# Patient Record
Sex: FEMALE | Race: WHITE | Employment: FULL TIME | URBAN - METROPOLITAN AREA
[De-identification: names, ages, dates, MRNs, and addresses within clinical notes are randomized per-mention and may not be internally consistent; named-entity substitution may affect disease eponyms.]

---

## 2023-03-04 ENCOUNTER — APPOINTMENT (OUTPATIENT)
Dept: CT IMAGING | Age: 18
End: 2023-03-04
Attending: STUDENT IN AN ORGANIZED HEALTH CARE EDUCATION/TRAINING PROGRAM
Payer: COMMERCIAL

## 2023-03-04 ENCOUNTER — APPOINTMENT (OUTPATIENT)
Dept: GENERAL RADIOLOGY | Age: 18
End: 2023-03-04
Attending: STUDENT IN AN ORGANIZED HEALTH CARE EDUCATION/TRAINING PROGRAM
Payer: COMMERCIAL

## 2023-03-04 ENCOUNTER — HOSPITAL ENCOUNTER (EMERGENCY)
Age: 18
Discharge: HOME | End: 2023-03-04
Attending: STUDENT IN AN ORGANIZED HEALTH CARE EDUCATION/TRAINING PROGRAM
Payer: COMMERCIAL

## 2023-03-04 VITALS
BODY MASS INDEX: 22.4 KG/M2 | WEIGHT: 160 LBS | DIASTOLIC BLOOD PRESSURE: 83 MMHG | TEMPERATURE: 97.7 F | SYSTOLIC BLOOD PRESSURE: 136 MMHG | HEIGHT: 71 IN | OXYGEN SATURATION: 97 % | RESPIRATION RATE: 18 BRPM | HEART RATE: 92 BPM

## 2023-03-04 DIAGNOSIS — S80.01XA CONTUSION OF RIGHT KNEE, INITIAL ENCOUNTER: ICD-10-CM

## 2023-03-04 DIAGNOSIS — S90.02XA CONTUSION OF LEFT ANKLE, INITIAL ENCOUNTER: ICD-10-CM
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
## 2023-03-04 PROCEDURE — 96374 THER/PROPH/DIAG INJ IV PUSH: CPT

## 2023-03-04 PROCEDURE — 73110 X-RAY EXAM OF WRIST: CPT

## 2023-03-04 PROCEDURE — 99284 EMERGENCY DEPT VISIT MOD MDM: CPT

## 2023-03-04 PROCEDURE — 70450 CT HEAD/BRAIN W/O DYE: CPT

## 2023-03-04 PROCEDURE — 71045 X-RAY EXAM CHEST 1 VIEW: CPT

## 2023-03-04 PROCEDURE — 73562 X-RAY EXAM OF KNEE 3: CPT

## 2023-03-04 PROCEDURE — 73610 X-RAY EXAM OF ANKLE: CPT

## 2023-03-04 PROCEDURE — 72125 CT NECK SPINE W/O DYE: CPT

## 2023-03-04 PROCEDURE — 36415 COLL VENOUS BLD VENIPUNCTURE: CPT

## 2023-03-04 PROCEDURE — 93005 ELECTROCARDIOGRAM TRACING: CPT

## 2023-03-04 PROCEDURE — 74011250636 HC RX REV CODE- 250/636: Performed by: STUDENT IN AN ORGANIZED HEALTH CARE EDUCATION/TRAINING PROGRAM

## 2023-03-04 RX ORDER — IBUPROFEN 600 MG/1
600 TABLET ORAL
Qty: 30 TABLET | Refills: 0 | Status: SHIPPED | OUTPATIENT
Start: 2023-03-04

## 2023-03-04 RX ORDER — KETOROLAC TROMETHAMINE 30 MG/ML
15 INJECTION, SOLUTION INTRAMUSCULAR; INTRAVENOUS
Status: COMPLETED | OUTPATIENT
Start: 2023-03-04 | End: 2023-03-04

## 2023-03-04 RX ADMIN — KETOROLAC TROMETHAMINE 15 MG: 30 INJECTION, SOLUTION INTRAMUSCULAR; INTRAVENOUS at 02:38
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
## 2023-03-07 LAB
ATRIAL RATE: 114 BPM
CALCULATED P AXIS, ECG09: 52 DEGREES
CALCULATED R AXIS, ECG10: 77 DEGREES
CALCULATED T AXIS, ECG11: 29 DEGREES
DIAGNOSIS, 93000: NORMAL
P-R INTERVAL, ECG05: 110 MS
Q-T INTERVAL, ECG07: 302 MS
QRS DURATION, ECG06: 74 MS
QTC CALCULATION (BEZET), ECG08: 416 MS
VENTRICULAR RATE, ECG03: 114 BPM

## 2025-07-17 ENCOUNTER — OFFICE VISIT (OUTPATIENT)
Age: 20
End: 2025-07-17

## 2025-07-17 VITALS
OXYGEN SATURATION: 98 % | RESPIRATION RATE: 16 BRPM | SYSTOLIC BLOOD PRESSURE: 122 MMHG | HEIGHT: 71 IN | WEIGHT: 162.4 LBS | BODY MASS INDEX: 22.73 KG/M2 | TEMPERATURE: 98.3 F | DIASTOLIC BLOOD PRESSURE: 82 MMHG | HEART RATE: 94 BPM

## 2025-07-17 DIAGNOSIS — H60.331 ACUTE SWIMMER'S EAR OF RIGHT SIDE: Primary | ICD-10-CM

## 2025-07-17 DIAGNOSIS — R03.0 ELEVATED BLOOD PRESSURE READING: ICD-10-CM

## 2025-07-17 RX ORDER — DEXAMETHASONE SODIUM PHOSPHATE 4 MG/ML
4 INJECTION, SOLUTION INTRA-ARTICULAR; INTRALESIONAL; INTRAMUSCULAR; INTRAVENOUS; SOFT TISSUE ONCE
Status: SHIPPED | OUTPATIENT
Start: 2025-07-17

## 2025-07-17 RX ORDER — CIPROFLOXACIN AND DEXAMETHASONE 3; 1 MG/ML; MG/ML
4 SUSPENSION/ DROPS AURICULAR (OTIC) 2 TIMES DAILY
Qty: 7.5 ML | Refills: 0 | Status: SHIPPED | OUTPATIENT
Start: 2025-07-17 | End: 2025-07-24

## 2025-07-17 RX ORDER — LISDEXAMFETAMINE DIMESYLATE 50 MG/1
50 CAPSULE ORAL EVERY MORNING
COMMUNITY
Start: 2025-05-12

## 2025-07-17 ASSESSMENT — ENCOUNTER SYMPTOMS
RESPIRATORY NEGATIVE: 1
EYES NEGATIVE: 1
ALLERGIC/IMMUNOLOGIC NEGATIVE: 1
GASTROINTESTINAL NEGATIVE: 1

## 2025-07-17 NOTE — PROGRESS NOTES
2025   Davi Novoa (: 2005) is a 19 y.o. female, here for evaluation of the following chief complaint(s):  Ear Pain (Pt c.o right ear pain, \"hard to open mouth\". Sx onset this weekend progressively gotten worst. Tylenol & ibuprofen taken for pain. Pain level 7/10. Sx have not subsided. )     ASSESSMENT/PLAN:  Below is the assessment and plan developed based on review of pertinent history, physical exam, labs, studies, and medications.         Assessment & Plan  Elevated blood pressure reading   I discussed with patient the elevated blood pressure readings.  We reviewed activities to reduce blood pressure to include low salt diet, exercise, weight loss, healthy diet and alcohol avoidance.  Patient understands.  Will follow up with PCP as needed.      Orders:    Ambulatory referral to Internal Medicine    Acute swimmer's ear of right side   Patient is previously healthy and immunocompetent, presenting with otitis externa.  There is no evidence for significant complications at this time.  Patient is alert and completely non-toxic.  There is no evidence for tympanic membrane rupture.   There is no mastoid swelling.  There is no evidence for otitis media based on examination.  There is no suggestion of systemic complications or sepsis.  I feel a trial of outpatient antibiotics is warranted at this time.  Patient agrees to follow up with PCP, ENT or the ER if symptoms worsen in any way, or develops any new symptoms.      Orders:    dexAMETHasone (DECADRON) IntraVENous 4 mg      Discharge paperwork given with care instructions  OTC for symptom management. Increase fluid intake, ensure adequate nutritional intake.  Follow up with PCP within 7 days or sooner if indicated.   Go to ED with development of any new or worsening acute symptoms, chest pain, shortness of breath, uncontrolled fever.     Follow up:  No follow-ups on file.  Follow up immediately for any new, worsening or changes or if symptoms are

## 2025-07-17 NOTE — PATIENT INSTRUCTIONS
Thank you for visiting [facility] today.    Prevention:  -Use vinegar with water, peroxide or rubbing alcohol in ears and let drain  -Dry ear with hairdryer set on low and approximately 12 inches away from ears  Bacterial Infection:   -Instill antibiotic ear drops as prescribed  Ear pain/fever:    -If you can take NSAIDs, take ibuprofen every 8 hours as needed  -If you cannot take NSAIDs, take Tylenol every 6 hours as needed  General Counseling:  -Proper ear hygiene:  “don't put anything smaller than your elbow in your ear” to clean the ear canal.  Ear canal is self cleaning and fingers, towels, cotton swabs, or other foreign objects should not be inserted in ear.    -The ear should be protected from water during recovery.  This can be accomplished by placing a cotton ball coated with petroleum jelly in the ear canal while bathing.  -No swimming  -Ideally, refrain from water sports for 7 to 10 days  -Competitive swimmers may consider return at 2-3 days if pain has resolved and they wear well-fitted earplugs  -Hearing aid and earphones should not be worn until pain and discharge have subsided    Please follow up with your PCP within 2-5 days if your signs and symptoms have not resolved or worsened.    Please go immediately to the ER if you develop severe hearing loss, intractable pain or uncontrollable fever above 100.4F.       Learning About Being Physically Active  What is physical activity?     Being physically active means doing any kind of activity that gets your body moving.  The types of physical activity that can help you get fit and stay healthy include:  Aerobic or \"cardio\" activities. These make your heart beat faster and make you breathe harder, such as brisk walking, riding a bike, or running. They strengthen your heart and lungs and build up your endurance.  Strength training activities. These make your muscles work against, or \"resist,\" something. Examples include lifting weights or doing

## 2025-07-18 ENCOUNTER — HOSPITAL ENCOUNTER (EMERGENCY)
Facility: HOSPITAL | Age: 20
Discharge: HOME | End: 2025-07-18
Attending: STUDENT IN AN ORGANIZED HEALTH CARE EDUCATION/TRAINING PROGRAM
Payer: COMMERCIAL

## 2025-07-18 VITALS
SYSTOLIC BLOOD PRESSURE: 121 MMHG | RESPIRATION RATE: 16 BRPM | TEMPERATURE: 98.3 F | BODY MASS INDEX: 22.4 KG/M2 | DIASTOLIC BLOOD PRESSURE: 82 MMHG | HEIGHT: 71 IN | HEART RATE: 98 BPM | OXYGEN SATURATION: 100 % | WEIGHT: 160 LBS

## 2025-07-18 DIAGNOSIS — H60.331 ACUTE SWIMMER'S EAR OF RIGHT SIDE: Primary | ICD-10-CM

## 2025-07-18 PROCEDURE — 6360000002 HC RX W HCPCS

## 2025-07-18 PROCEDURE — 6370000000 HC RX 637 (ALT 250 FOR IP)

## 2025-07-18 PROCEDURE — 96372 THER/PROPH/DIAG INJ SC/IM: CPT

## 2025-07-18 PROCEDURE — 99284 EMERGENCY DEPT VISIT MOD MDM: CPT

## 2025-07-18 RX ORDER — IBUPROFEN 600 MG/1
600 TABLET, FILM COATED ORAL 3 TIMES DAILY PRN
Qty: 30 TABLET | Refills: 0 | Status: SHIPPED | OUTPATIENT
Start: 2025-07-18

## 2025-07-18 RX ORDER — KETOROLAC TROMETHAMINE 30 MG/ML
30 INJECTION, SOLUTION INTRAMUSCULAR; INTRAVENOUS ONCE
Status: COMPLETED | OUTPATIENT
Start: 2025-07-18 | End: 2025-07-18

## 2025-07-18 RX ORDER — DEXAMETHASONE 6 MG/1
6 TABLET ORAL ONCE
Status: COMPLETED | OUTPATIENT
Start: 2025-07-18 | End: 2025-07-18

## 2025-07-18 RX ADMIN — KETOROLAC TROMETHAMINE 30 MG: 30 INJECTION, SOLUTION INTRAMUSCULAR at 15:16

## 2025-07-18 RX ADMIN — DEXAMETHASONE 6 MG: 6 TABLET ORAL at 15:16

## 2025-07-18 ASSESSMENT — PAIN DESCRIPTION - PAIN TYPE: TYPE: ACUTE PAIN

## 2025-07-18 ASSESSMENT — PAIN - FUNCTIONAL ASSESSMENT: PAIN_FUNCTIONAL_ASSESSMENT: 0-10

## 2025-07-18 ASSESSMENT — PAIN DESCRIPTION - LOCATION: LOCATION: EAR

## 2025-07-18 ASSESSMENT — PAIN DESCRIPTION - DESCRIPTORS: DESCRIPTORS: OTHER (COMMENT)

## 2025-07-18 ASSESSMENT — PAIN DESCRIPTION - ORIENTATION: ORIENTATION: RIGHT

## 2025-07-18 ASSESSMENT — PAIN SCALES - GENERAL: PAINLEVEL_OUTOF10: 9

## 2025-07-18 NOTE — ED PROVIDER NOTES
[facility] EMERGENCY DEPARTMENT  EMERGENCY DEPARTMENT ENCOUNTER      Pt Name: Davi Novoa  MRN: [id number]  Birthdate 2005  Date of evaluation: 7/18/2025  Provider: TEA Robertson    CHIEF COMPLAINT       Chief Complaint   Patient presents with    Ear Pain     right         HISTORY OF PRESENT ILLNESS   (Location/Symptom, Timing/Onset, Context/Setting, Quality, Duration, Modifying Factors, Severity)  Note limiting factors.   Davi Novoa is a 19 y.o. female who presents to the emergency department complaining of right ear pain for the past week.  Reports last week and going to a break and then abruptly afterwards having pain in the right ear.  She reports having a fullness sensation and decreased hearing but denies any drainage.  She denies any fevers, chills, nausea, vomiting.  Denies any foreign bodies in the ear.  Reports taking amoxicillin at home and going to urgent care yesterday where she was given Ciprodex drops but is still having severe pain at this time.              Review of External Medical Records:     Nursing Notes were reviewed.    REVIEW OF SYSTEMS    (2-9 systems for level 4, 10 or more for level 5)     Review of Systems    Except as noted above the remainder of the review of systems was reviewed and negative.       PAST MEDICAL HISTORY     Past Medical History:   Diagnosis Date    ADHD          SURGICAL HISTORY     No past surgical history on file.      CURRENT MEDICATIONS       Previous Medications    CIPROFLOXACIN-DEXAMETHASONE (CIPRODEX) 0.3-0.1 % OTIC SUSPENSION    Place 4 drops into the right ear 2 times daily for 7 days    IBUPROFEN (ADVIL;MOTRIN) 600 MG TABLET    Take 1 tablet by mouth every 8 hours as needed    LISDEXAMFETAMINE (VYVANSE) 50 MG CAPSULE    Take 1 capsule by mouth every morning. for 30 days       ALLERGIES     Patient has no known allergies.    FAMILY HISTORY     No family history on file.       SOCIAL HISTORY       Social History     Socioeconomic History

## 2025-07-18 NOTE — ED TRIAGE NOTES
Pt ambulated to the treatment area with a steady gait.Pt states \"I started and ear infection on Tuesday I went to [facility] they gave me drops ciprofloxacin/dexamethasone and I started myself on oral antibiotic amoxicillin but nothing is helping its getting worse and the pain is spreading. [place] know if that has anything to do with this pain.\"

## 2025-07-18 NOTE — DISCHARGE INSTRUCTIONS
Please continue applying 4 drops of Ciprodex in the right ear twice a day for the next 7 days.  This will help with the pain and inflammation to resolve the acute infection.  You may take ibuprofen every 6-8 hours now with the pain and inflammation as well.  It is not necessary that you take amoxicillin due to the ear infection being an external ear infection.  Please keep the ear dry to help resolve the symptoms.  Follow-up with PCP in 1 week.